# Patient Record
Sex: MALE | Race: WHITE | Employment: UNEMPLOYED | URBAN - METROPOLITAN AREA
[De-identification: names, ages, dates, MRNs, and addresses within clinical notes are randomized per-mention and may not be internally consistent; named-entity substitution may affect disease eponyms.]

---

## 2019-02-26 ENCOUNTER — TRANSFERRED RECORDS (OUTPATIENT)
Dept: HEALTH INFORMATION MANAGEMENT | Facility: CLINIC | Age: 59
End: 2019-02-26

## 2019-02-27 LAB
CHOLEST SERPL-MCNC: 186 MG/DL (ref 0–199)
EJECTION FRACTION: 25
HDLC SERPL-MCNC: 46 MG/DL
LDLC SERPL CALC-MCNC: 109 MG/DL (ref 0–129)
NONHDLC SERPL-MCNC: 140 MG/DL (ref 0–159)
TRIGL SERPL-MCNC: 153 MG/DL (ref 0–149)

## 2019-03-02 LAB — HBA1C MFR BLD: 5.7 % (ref 4.6–5.6)

## 2019-03-08 LAB
CREAT SERPL-MCNC: 0.97 MG/DL (ref 0.73–1.18)
GFR SERPL CREATININE-BSD FRML MDRD: >60 ML/MIN/1.73M2
GLUCOSE SERPL-MCNC: 98 MG/DL (ref 70–180)
POTASSIUM SERPL-SCNC: 4 MMOL/L (ref 3.5–5.1)

## 2020-07-19 ENCOUNTER — APPOINTMENT (OUTPATIENT)
Dept: GENERAL RADIOLOGY | Facility: CLINIC | Age: 60
End: 2020-07-19
Attending: EMERGENCY MEDICINE
Payer: COMMERCIAL

## 2020-07-19 ENCOUNTER — HOSPITAL ENCOUNTER (EMERGENCY)
Facility: CLINIC | Age: 60
Discharge: ANOTHER HOSPITAL | End: 2020-07-19
Attending: EMERGENCY MEDICINE | Admitting: EMERGENCY MEDICINE
Payer: COMMERCIAL

## 2020-07-19 VITALS
WEIGHT: 215 LBS | HEART RATE: 71 BPM | DIASTOLIC BLOOD PRESSURE: 81 MMHG | RESPIRATION RATE: 17 BRPM | OXYGEN SATURATION: 97 % | SYSTOLIC BLOOD PRESSURE: 121 MMHG

## 2020-07-19 DIAGNOSIS — I47.29 PAROXYSMAL VENTRICULAR TACHYCARDIA (H): ICD-10-CM

## 2020-07-19 LAB
ANION GAP SERPL CALCULATED.3IONS-SCNC: 11 MMOL/L (ref 3–14)
ANION GAP SERPL CALCULATED.3IONS-SCNC: 9 MMOL/L (ref 3–14)
BASOPHILS # BLD AUTO: 0.1 10E9/L (ref 0–0.2)
BASOPHILS NFR BLD AUTO: 0.6 %
BUN SERPL-MCNC: 33 MG/DL (ref 7–30)
BUN SERPL-MCNC: 35 MG/DL (ref 7–30)
CALCIUM SERPL-MCNC: 8.6 MG/DL (ref 8.5–10.1)
CALCIUM SERPL-MCNC: 9.2 MG/DL (ref 8.5–10.1)
CHLORIDE SERPL-SCNC: 112 MMOL/L (ref 94–109)
CHLORIDE SERPL-SCNC: 112 MMOL/L (ref 94–109)
CO2 SERPL-SCNC: 17 MMOL/L (ref 20–32)
CO2 SERPL-SCNC: 19 MMOL/L (ref 20–32)
CREAT SERPL-MCNC: 1.06 MG/DL (ref 0.66–1.25)
CREAT SERPL-MCNC: 1.12 MG/DL (ref 0.66–1.25)
DIFFERENTIAL METHOD BLD: ABNORMAL
EOSINOPHIL # BLD AUTO: 0.1 10E9/L (ref 0–0.7)
EOSINOPHIL NFR BLD AUTO: 0.7 %
ERYTHROCYTE [DISTWIDTH] IN BLOOD BY AUTOMATED COUNT: 11.6 % (ref 10–15)
GFR SERPL CREATININE-BSD FRML MDRD: 70 ML/MIN/{1.73_M2}
GFR SERPL CREATININE-BSD FRML MDRD: 75 ML/MIN/{1.73_M2}
GLUCOSE SERPL-MCNC: 158 MG/DL (ref 70–99)
GLUCOSE SERPL-MCNC: 164 MG/DL (ref 70–99)
HCT VFR BLD AUTO: 48.7 % (ref 40–53)
HGB BLD-MCNC: 16.5 G/DL (ref 13.3–17.7)
IMM GRANULOCYTES # BLD: 0.1 10E9/L (ref 0–0.4)
IMM GRANULOCYTES NFR BLD: 0.9 %
LYMPHOCYTES # BLD AUTO: 2.3 10E9/L (ref 0.8–5.3)
LYMPHOCYTES NFR BLD AUTO: 15.1 %
MAGNESIUM SERPL-MCNC: 2.3 MG/DL (ref 1.6–2.3)
MCH RBC QN AUTO: 33.8 PG (ref 26.5–33)
MCHC RBC AUTO-ENTMCNC: 33.9 G/DL (ref 31.5–36.5)
MCV RBC AUTO: 100 FL (ref 78–100)
MONOCYTES # BLD AUTO: 1.2 10E9/L (ref 0–1.3)
MONOCYTES NFR BLD AUTO: 7.9 %
NEUTROPHILS # BLD AUTO: 11.6 10E9/L (ref 1.6–8.3)
NEUTROPHILS NFR BLD AUTO: 74.8 %
NRBC # BLD AUTO: 0 10*3/UL
NRBC BLD AUTO-RTO: 0 /100
PLATELET # BLD AUTO: 281 10E9/L (ref 150–450)
POTASSIUM SERPL-SCNC: 4.3 MMOL/L (ref 3.4–5.3)
POTASSIUM SERPL-SCNC: ABNORMAL MMOL/L (ref 3.4–5.3)
RBC # BLD AUTO: 4.88 10E12/L (ref 4.4–5.9)
SODIUM SERPL-SCNC: 140 MMOL/L (ref 133–144)
SODIUM SERPL-SCNC: 140 MMOL/L (ref 133–144)
TROPONIN I SERPL-MCNC: 0.04 UG/L (ref 0–0.04)
TSH SERPL DL<=0.005 MIU/L-ACNC: 2.93 MU/L (ref 0.4–4)
WBC # BLD AUTO: 15.5 10E9/L (ref 4–11)

## 2020-07-19 PROCEDURE — 71045 X-RAY EXAM CHEST 1 VIEW: CPT

## 2020-07-19 PROCEDURE — 93005 ELECTROCARDIOGRAM TRACING: CPT | Mod: 59

## 2020-07-19 PROCEDURE — 25000128 H RX IP 250 OP 636: Performed by: EMERGENCY MEDICINE

## 2020-07-19 PROCEDURE — 93005 ELECTROCARDIOGRAM TRACING: CPT | Mod: 76

## 2020-07-19 PROCEDURE — 96366 THER/PROPH/DIAG IV INF ADDON: CPT | Mod: 59

## 2020-07-19 PROCEDURE — 27211117 ZZH CARDIOVERT/DEFIB/PACER SUPP

## 2020-07-19 PROCEDURE — 84484 ASSAY OF TROPONIN QUANT: CPT | Performed by: EMERGENCY MEDICINE

## 2020-07-19 PROCEDURE — 92960 CARDIOVERSION ELECTRIC EXT: CPT

## 2020-07-19 PROCEDURE — 83735 ASSAY OF MAGNESIUM: CPT | Performed by: EMERGENCY MEDICINE

## 2020-07-19 PROCEDURE — 96365 THER/PROPH/DIAG IV INF INIT: CPT | Mod: 59

## 2020-07-19 PROCEDURE — 96376 TX/PRO/DX INJ SAME DRUG ADON: CPT

## 2020-07-19 PROCEDURE — 99291 CRITICAL CARE FIRST HOUR: CPT | Mod: 25

## 2020-07-19 PROCEDURE — 85025 COMPLETE CBC W/AUTO DIFF WBC: CPT | Performed by: EMERGENCY MEDICINE

## 2020-07-19 PROCEDURE — 84443 ASSAY THYROID STIM HORMONE: CPT | Performed by: EMERGENCY MEDICINE

## 2020-07-19 PROCEDURE — 25800030 ZZH RX IP 258 OP 636: Performed by: EMERGENCY MEDICINE

## 2020-07-19 PROCEDURE — 80048 BASIC METABOLIC PNL TOTAL CA: CPT | Mod: 91 | Performed by: EMERGENCY MEDICINE

## 2020-07-19 RX ADMIN — AMIODARONE HYDROCHLORIDE 150 MG: 1.5 INJECTION, SOLUTION INTRAVENOUS at 18:54

## 2020-07-19 RX ADMIN — SODIUM CHLORIDE 500 ML: 9 INJECTION, SOLUTION INTRAVENOUS at 17:53

## 2020-07-19 RX ADMIN — AMIODARONE HYDROCHLORIDE 1 MG/MIN: 50 INJECTION, SOLUTION INTRAVENOUS at 19:05

## 2020-07-19 ASSESSMENT — ENCOUNTER SYMPTOMS
FEVER: 0
ABDOMINAL PAIN: 0
SHORTNESS OF BREATH: 1
PALPITATIONS: 1
VOMITING: 1

## 2020-07-19 NOTE — ED TRIAGE NOTES
Palpitations starting mid morning this AM. Pt presents diaphoretic and SOB. Pt reports worsening symptoms when laying flat.

## 2020-07-19 NOTE — ED PROVIDER NOTES
History     Chief Complaint:  Palpitations      HPI   Jn Ang is a 60 year old male who presents with palpitations. The patient reports this [place] when he started to feel his heart racing. He states he initially attributed this to the heat and humidity and believed it would get better. He notes it did not go away, and started to develop shortness of breath and vomited. Maybe a little of chest pain. He denies abdominal pain, fever, and missing medications. He states he might have had some shortness of breath in the past week, but nothing out of the ordinary.      Allergies:  No known drug allergies    Medications:    Metoprolol  Anticoagulaiton    Past Medical History:    The patient does not have any past pertinent medical history.    Past Surgical History:    History reviewed. No pertinent surgical history.    Family History:    History reviewed. No pertinent family history.    Social History:  Presents to the ED alone  PCP: No primary care provider on file.  Marital Status:  Single [1]    Review of Systems   Constitutional: Negative for fever.   Respiratory: Positive for shortness of breath.    Cardiovascular: Positive for palpitations.   Gastrointestinal: Positive for vomiting. Negative for abdominal pain.   All other systems reviewed and are negative.    Physical Exam     Patient Vitals for the past 24 hrs:   BP Pulse Heart Rate Resp SpO2 Weight   07/19/20 1937 -- -- 73 18 -- --   07/19/20 1930 103/84 67 66 16 97 % --   07/19/20 1915 114/71 66 67 10 97 % --   07/19/20 1900 106/83 77 81 10 91 % --   07/19/20 1858 106/83 -- 78 13 98 % --   07/19/20 1845 104/80 76 80 16 96 % --   07/19/20 1830 108/83 84 81 -- 98 % --   07/19/20 1815 102/85 90 87 16 97 % --   07/19/20 1800 -- 85 86 19 99 % --   07/19/20 1755 -- -- 89 13 99 % --   07/19/20 1750 (!) 130/97 87 91 28 99 % --   07/19/20 1745 115/83 86 87 -- 100 % --   07/19/20 1740 -- -- -- -- 100 % --   07/19/20 1738 -- -- --  -- 100 % --   07/19/20 1737 -- -- -- -- 100 % --   07/19/20 1736 96/82 -- -- -- 97 % --   07/19/20 1735 96/82 -- -- -- 97 % 97.5 kg (215 lb)   07/19/20 1730 (!) 117/95 -- -- -- 100 % --   07/19/20 1728 -- (!) 201 (!) 201 20 100 % --   07/19/20 1727 -- (!) 228 -- -- -- --       Physical Exam    General: Sitting up in bed, appears ill  Eyes:  The pupils are equal and round    Conjunctivae and sclerae are normal  ENT:    Moist mucous membranes  Neck:  Normal range of motion  CV:  Tachycardic rate, regular rhythm    Skin warm and well perfused   Resp:  Non labored breathing on room air  GI:  Abdomen is soft, there is no rigidity    No distension    No rebound tenderness     No abdominal tenderness  MS:  Normal muscular tone  Skin:  Diaphoretic  Neuro:   Awake, alert.      Speech is normal and fluent.    Face is symmetric.     Moves all extremities equally  Psych: Normal affect.  Appropriate interactions.    Emergency Department Course   ECG #1 (17:27:04):  Rate 200 bpm. ID interval *. QRS duration 124. QT/QTc 266/485. P-R-T axes * 36 176. Wide QRS tachycardia. Non-specific intra-ventricular conduction delay. ST elevation consider inferior injury or acute infarct. ** ** ACUTE MYOCARDIAL INFARCTION/STEMI ** ** Consider right ventricular involvement in acute inferior infarct. Abnormal ECG. No previous EKG for comparison.  Interpreted at 1727 by Nehal Mireles MD.     ECG #2 (17:39:30):  Rate 97 bpm. ID interval 156. QRS duration 128. QT/QTc 374/474. P-R-T axes 76 17 15. Sinus rhythm with occasional premature ventricular complexes. Non-specific intra-ventricular conduction block. Inferior infarct, age undetermined. Cannot rule out anteroseptal infarct, age undetermined. Abnormal ECG. When compared to EKG on 7/19/20, there is now sinus rhythm with left bundle branch block.  Interpreted at 1740 by Nehal Mireles MD.    ECG #3 (18:05:04):  Rate 94 bpm. ID interval 154. QRS duration 124. QT/QTc 396/495. P-R-T  axes 65 -2 53. Undetermined /rhythm. Septal infarct. Inferior infarct. Abnormal ECG. No changes compared to EKG on 7/19/20. Interpreted at 1805 by Nehal Mireles MD.     Imaging:  Radiographic findings were communicated with the patient who voiced understanding of the findings.    XR Chest Port 1 View  Mild cardia megaly. Prior sternotomy and apparent CABG procedure. No evidence for CHF or pneumonia. No pleural effusion or pneumothorax.   As read by Radiology.    Laboratory:  CBC: WBC 15.5 (H)WNL (HGB 16.5, )  BMP (Collected 1735): Chloride 112 (H), Carbon dioxide 17 (L), Glucose 164 (H), BUN 35 (H), Potassium canceled o/w WNL (Creatinine 1.12)  BMP (Collected 1810): Chloride 112 (H), Carbon Dioxide 19 (L), Glucose 158 (H), BUN 33 (H) o/w WNL (Creatinine 1.06)  Troponin I (Collected 1735): 0.042  Magnesium: 2.3    [facility]    -Cardioversion External    Date/Time: 7/19/2020 6:50 PM  Performed by: Nehal Mireles MD  Authorized by: Nehal Mireles MD       PRE-PROCEDURE DETAILS:     Cardioversion basis:  Emergent    Rhythm:  Ventricular tachycardia    Electrode placement:  Anterior-posterior  Attempt one:     Cardioversion mode:  Synchronous    Shock (Joules):  200    Shock outcome:  Conversion to normal sinus rhythm  Post-procedure details:     Patient status:  Alert    PROCEDURE   Patient Tolerance:  Patient tolerated the procedure well with no immediate complications      :  Interventions:  1753:  mL IV Bolus   1854: Amiodarone 150 mg bolus IV  1905: Amiodarone 1 mg/min in 250 mL D5W infusion IV    Emergency Department Course:  Past medical records, nursing notes, and vitals reviewed.  1725: I performed an exam of the patient and obtained history, as documented above.  3 EKG's performed, results above.  IV inserted and blood drawn.  The patient was sent for a portable chest x-ray while in the emergency department, findings above.     I performed a cardioversion.  See note above    1739: I spoke to Dr. Longo of cardiology at [facility].     1858: I spoke to Dr. Agrawal of cardiology at [facility]    Findings and plan explained to the Patient. He would prefer to go to [facility].     1844: Patient will be transferred to [facility] via EMS. Discussed the case with Dr. Law, who will admit the patient to a monitored bed for further monitoring, evaluation, and treatment.     Patient appears much improved with resolution of diaphoresis and feels back to normal    Impression & Plan      Medical Decision Making:  Jn Ang is a 60 year old male who presented to the ED with palpitations. Patient was diaphoretic, appeared ill on arrival to ED. EKG with findings concerning for vtach with elevation of lead III, depression V2. STEMI activated. Blood pressure started to decrease and given ill appearance, patient cardioverted. Went back to sinus rhythm with BBB. Spoke to interventional cardiology and cath lab canceled due to no ST elevation/STEMI on repeat EKG once in sinus rhythm. Spoke to general cardiology about EKG. These were reviewed by cardiology who recommended treating this as ventricular tachycardia and load with amiodarone. He should have an ICD given last EF was 20% but has not had one placed yet. REcommended transfer to [facility]. Patient would prefer transfer to [facility] given that his care is through [facility] cardiology. Discussed with hospitalist at [facility] for transfer. Patient loaded with amiodarone and started on drip. Troponin wnl and ACS seems less unlikely especially since no chest pain and has had symptoms since this morning.  Doubt PE. Patient feels back to normal after back in sinus rhythm.    Critical Care time:  was 30 minutes for this patient excluding procedures.    Covid-19  Jn Ang was evaluated during a global COVID-19 pandemic, which necessitated consideration that the patient might be at risk for infection with the  SARS-CoV-2 virus that causes COVID-19.   Applicable protocols for evaluation were followed during the patient's care.   COVID-19 was considered as part of the patient's evaluation. The plan for testing is:[facility] will test when he gets to [facility]    Diagnosis:    ICD-10-CM    1. Paroxysmal ventricular tachycardia (H)  I47.2 Basic metabolic panel (BMP)     TSH with free T4 reflex     TSH with free T4 reflex     CANCELED: TSH with free T4 reflex       Disposition:  Transferred to [facility]  7/19/2020   [facility] EMERGENCY DEPARTMENT  I, Kraig Page, am serving as a scribe at 5:29 PM on 7/19/2020 to document services personally performed by Nehal Mireles MD based on my observations and the provider's statements to me.        Nehal Mireles MD  07/19/20 2140       Nehal Mireles MD  07/19/20 2140

## 2020-07-20 LAB
INTERPRETATION ECG - MUSE: NORMAL

## 2020-07-20 NOTE — ED NOTES
2020, 7:53 PM    Destination Facility:   [facility] _  Name:  Jn Ang  Age: 60 year old  : 1960  Gender: male  Weight:   Wt Readings from Last 2 Encounters:   20 97.5 kg (215 lb)       Diagnosis:      ICD-10-CM    1. Paroxysmal ventricular tachycardia (H)  I47.2 Basic metabolic panel (BMP)     TSH with free T4 reflex     TSH with free T4 reflex     CANCELED: TSH with free T4 reflex       Allergies:  No Known Allergies    Vitals:  Patient Vitals for the past 2 hrs:   BP Pulse Heart Rate Resp SpO2   20 -- -- 73 18 --   20 103/84 67 66 16 97 %   20 114/71 66 67 10 97 %   20 106/83 77 81 10 91 %   208 106/83 -- 78 13 98 %   20 1845 104/80 76 80 16 96 %   20 1830 108/83 84 81 -- 98 %   20 102/85 90 87 16 97 %   20 1800 -- 85 86 19 99 %   20 175 -- -- 89 13 99 %        Medications Given:  Medications   amiodarone (NEXTERONE) 250 mg in D5W 250 mL infusion (1 mg/min Intravenous New Bag 20)   amiodarone (NEXTERONE) 250 mg in D5W 250 mL infusion (has no administration in time range)   0.9% sodium chloride BOLUS (0 mLs Intravenous Stopped 20)   amiodarone (NEXTERONE) bolus 150 mg (150 mg Intravenous Given 20)       O2 / VENT Settings:     1.  NC @ __ lpm     2.  BiPAP  __/__     __% FiO2     3.  ET Tube:  ____ mm.  Secured ____ cm @ teeth     4.  Vent:  Mode: ____  RR: ____ Vt: ____mL FiO2: _____%     IV/Drains:     1.  PIV:    18 L AC. 22 R hand      2.  Central line:  ___________     3.  Arterial line:  ___________     4.  Catheter:  ___________     5.  NGT  ___________     6.  Chest Tube ___________     7.  Other:   ___________      Time report called to receiving facility: Report to Receiving Facility: Yes        INSTRUCTIONS TO EMS: Amiodarone infusion